# Patient Record
Sex: FEMALE | Race: BLACK OR AFRICAN AMERICAN | NOT HISPANIC OR LATINO | Employment: FULL TIME | URBAN - METROPOLITAN AREA
[De-identification: names, ages, dates, MRNs, and addresses within clinical notes are randomized per-mention and may not be internally consistent; named-entity substitution may affect disease eponyms.]

---

## 2019-05-22 ENCOUNTER — PROCEDURE VISIT (OUTPATIENT)
Dept: OBSTETRICS AND GYNECOLOGY | Age: 59
End: 2019-05-22

## 2019-05-22 ENCOUNTER — OFFICE VISIT (OUTPATIENT)
Dept: OBSTETRICS AND GYNECOLOGY | Age: 59
End: 2019-05-22

## 2019-05-22 VITALS
WEIGHT: 272 LBS | SYSTOLIC BLOOD PRESSURE: 136 MMHG | HEIGHT: 63 IN | BODY MASS INDEX: 48.2 KG/M2 | DIASTOLIC BLOOD PRESSURE: 84 MMHG

## 2019-05-22 DIAGNOSIS — Z12.72 VAGINAL PAP SMEAR: ICD-10-CM

## 2019-05-22 DIAGNOSIS — N89.8 VAGINAL DISCHARGE: ICD-10-CM

## 2019-05-22 DIAGNOSIS — L68.0 FAMILIAL HIRSUTISM: ICD-10-CM

## 2019-05-22 DIAGNOSIS — N39.46 MIXED STRESS AND URGE URINARY INCONTINENCE: ICD-10-CM

## 2019-05-22 DIAGNOSIS — Z01.419 WELL FEMALE EXAM WITH ROUTINE GYNECOLOGICAL EXAM: Primary | ICD-10-CM

## 2019-05-22 DIAGNOSIS — E66.01 OBESITY, CLASS III, BMI 40-49.9 (MORBID OBESITY) (HCC): ICD-10-CM

## 2019-05-22 DIAGNOSIS — I10 HYPERTENSION, ESSENTIAL: ICD-10-CM

## 2019-05-22 DIAGNOSIS — Z12.31 SCREENING MAMMOGRAM, ENCOUNTER FOR: ICD-10-CM

## 2019-05-22 DIAGNOSIS — Z13.89 SCREENING FOR BLOOD OR PROTEIN IN URINE: ICD-10-CM

## 2019-05-22 DIAGNOSIS — L68.0 FAMILIAL HIRSUTISM: Primary | ICD-10-CM

## 2019-05-22 LAB
BILIRUB BLD-MCNC: ABNORMAL MG/DL
CLARITY, POC: CLEAR
COLOR UR: YELLOW
GLUCOSE UR STRIP-MCNC: NEGATIVE MG/DL
KETONES UR QL: NEGATIVE
LEUKOCYTE EST, POC: NEGATIVE
NITRITE UR-MCNC: NEGATIVE MG/ML
PH UR: 6 [PH] (ref 5–8)
PROT UR STRIP-MCNC: ABNORMAL MG/DL
RBC # UR STRIP: NEGATIVE /UL
SP GR UR: 1.03 (ref 1–1.03)
UROBILINOGEN UR QL: NORMAL

## 2019-05-22 PROCEDURE — 99396 PREV VISIT EST AGE 40-64: CPT | Performed by: OBSTETRICS & GYNECOLOGY

## 2019-05-22 PROCEDURE — 81002 URINALYSIS NONAUTO W/O SCOPE: CPT | Performed by: OBSTETRICS & GYNECOLOGY

## 2019-05-22 PROCEDURE — 76830 TRANSVAGINAL US NON-OB: CPT | Performed by: OBSTETRICS & GYNECOLOGY

## 2019-05-22 PROCEDURE — 99213 OFFICE O/P EST LOW 20 MIN: CPT | Performed by: OBSTETRICS & GYNECOLOGY

## 2019-05-22 RX ORDER — LISINOPRIL AND HYDROCHLOROTHIAZIDE 20; 12.5 MG/1; MG/1
1 TABLET ORAL DAILY
COMMUNITY
End: 2020-05-26

## 2019-05-22 NOTE — PROGRESS NOTES
Routine Annual Visit    2019    Patient: Pita Dominguez          MR#:[id number]      History of Present Illness    Chief Complaint   Patient presents with   • Establish Care     New patient   • Gynecologic Exam     Annual.  c/o clear discharge and odor x several months.  No gyn care for more than 10 years. Had a cologard/stool test around 49 y.o. normal, through PCP.        58 y.o. female  who presents for annual exam.    Presents for routine annual exam with complaint of intermittent moderate clear discharge for several months.  She reports not having had an annual exam or mammogram for some time.  Her primary care office is contacted regarding her blood pressure medication for clarification of name and dosing.  That office reports that there is been some compliance issues with follow-up there.  The patient has an elderly parent in the nursing home he consumes a lot of her free time    Exam is remarkable for prominent hirsutism.  The patient states the problem dates back to her childhood and is never really worsen.    The patient also complains of intermittent moderate leakage of urine with cough and sneeze on a daily basis and is asking to see a specialist for treatment options.  She states the cough and leak for exacerbated by any type of Valsalva and the problems been for more than 10 years      No LMP recorded (lmp unknown). Patient has had a hysterectomy.  Obstetric History:  OB History      Para Term  AB Living    2       2      SAB TAB Ectopic Molar Multiple Live Births                        Menstrual History:     No LMP recorded (lmp unknown). Patient has had a hysterectomy.       Sexual History:       ________________________________________  Patient Active Problem List   Diagnosis   • Well female exam with routine gynecological exam   • Hypertension, essential   • Obesity, Class III, BMI 40-49.9 (morbid obesity) (CMS/[facility])   • Mixed stress and urge urinary incontinence   •  "Familial hirsutism       Past Medical History:   Diagnosis Date   • H/O birth defect     cerebal palsy   • History of uterine fibroid     Hysterectomy while in her 30's   • Hypertension        Past Surgical History:   Procedure Laterality Date   • HYSTERECTOMY      age 30's   • LEG SURGERY Left     Childhood due to cerebal palsy.    • WISDOM TOOTH EXTRACTION         Social History     Tobacco Use   Smoking Status Never Smoker   Smokeless Tobacco Never Used       Family History   Problem Relation Age of Onset   • Diabetes Mother    • Other Mother         clots in legs   • Cancer Maternal Uncle    • Cancer Maternal Uncle        Prior to Admission medications    Medication Sig Start Date End Date Taking? Authorizing Provider   lisinopril-hydrochlorothiazide (PRINZIDE,ZESTORETIC) 20-12.5 MG per tablet Take 1 tablet by mouth Daily.   Yes Provider, Juan R, MD     ________________________________________    Current contraception: status post hysterectomy  History of abnormal Pap smear: no  Family history of uterine or ovarian cancer: no  Family History of colon cancer/colon polyps: no  History of abnormal mammogram: no  History of abnormal lipids: no    The following portions of the patient's history were reviewed and updated as appropriate: allergies, current medications, past family history, past medical history, past social history, past surgical history and problem list.    Review of Systems    Pertinent items are noted in HPI.     Objective   Physical Exam    /84   Ht 160 cm (63\")   Wt 123 kg (272 lb)   LMP  (LMP Unknown)   Breastfeeding? No   BMI 48.18 kg/m²    BP Readings from Last 3 Encounters:   05/22/19 136/84      Wt Readings from Last 3 Encounters:   05/22/19 123 kg (272 lb)        BMI: Estimated body mass index is 48.18 kg/m² as calculated from the following:    Height as of this encounter: 160 cm (63\").    Weight as of this encounter: 123 kg (272 lb).       General: alert, appears stated age " and cooperative   Heart: regular rate and rhythm, S1, S2 normal, no murmur, click, rub or gallop   Lungs: clear to auscultation bilaterally   Abdomen: soft, non-tender, without masses, no organomegaly, rotund, exceptionally limited exam secondary to habitus   Breast: inspection negative, no nipple discharge or bleeding, no masses or nodularity palpable   Vulva: normal, bartholin's, Urethra, Driggs's normal and mild atrophy   Vagina: normal mucosa   Cervix: absent   Uterus: absent    Adnexa: exam limited by habitus     As part of wellness and prevention, the following topics were discussed with the patient:  []  Nutrition  [x]  Physical activity/regular exercise   []  Healthy weight  []  Injury prevention  []  Smoking cessation  []  Substance misuse/abuse  []  Sexual behavior  []  STD prevention  []  Contaception  []  Dental health  []  Mental health  []  Immunization  [x]  Encouraged SBE        Assessment:    Pita was seen today for establish care and gynecologic exam.    Diagnoses and all orders for this visit:    Well female exam with routine gynecological exam    Screening for blood or protein in urine  -     POC Urinalysis Dipstick    Vaginal discharge  - Nuswab    Screening mammogram, encounter for  -     Mammo Screening Bilateral With CAD; Future    Hypertension, essential    Obesity, Class III, BMI 40-49.9 (morbid obesity) (CMS/HCC)    Mixed stress and urge urinary incontinence (new)  -  Referral to urogynecology    Familial hirsutism  -Patient states long-standing problem dating back to her childhood.  Discussed ultrasound screening for exclusion of ovarian etiology.        Plan:  Return for Annual GYN exam.      Yifan Wood MD  5/22/2019 9:15 AM

## 2019-05-24 ENCOUNTER — TELEPHONE (OUTPATIENT)
Dept: OBSTETRICS AND GYNECOLOGY | Age: 59
End: 2019-05-24

## 2019-05-24 LAB
CYTOLOGIST CVX/VAG CYTO: NORMAL
CYTOLOGY CVX/VAG DOC CYTO: NORMAL
CYTOLOGY CVX/VAG DOC THIN PREP: NORMAL
DX ICD CODE: NORMAL
HIV 1 & 2 AB SER-IMP: NORMAL
HPV I/H RISK 4 DNA CVX QL PROBE+SIG AMP: NEGATIVE
OTHER STN SPEC: NORMAL
STAT OF ADQ CVX/VAG CYTO-IMP: NORMAL

## 2019-05-25 LAB
A VAGINAE DNA VAG QL NAA+PROBE: ABNORMAL SCORE
BVAB2 DNA VAG QL NAA+PROBE: ABNORMAL SCORE
C ALBICANS DNA VAG QL NAA+PROBE: NEGATIVE
C GLABRATA DNA VAG QL NAA+PROBE: POSITIVE
C TRACH RRNA SPEC QL NAA+PROBE: NEGATIVE
MEGA1 DNA VAG QL NAA+PROBE: ABNORMAL SCORE
N GONORRHOEA RRNA SPEC QL NAA+PROBE: NEGATIVE
T VAGINALIS RRNA SPEC QL NAA+PROBE: NEGATIVE

## 2019-05-27 NOTE — PROGRESS NOTES
Notify patient: Vaginosis panel returns positive for vaginal yeast.    Prescription for yeast medicine sent the patient's pharmacy

## 2019-05-28 ENCOUNTER — TELEPHONE (OUTPATIENT)
Dept: OBSTETRICS AND GYNECOLOGY | Age: 59
End: 2019-05-28

## 2019-05-28 NOTE — TELEPHONE ENCOUNTER
----- Message from Yifan Wood MD sent at 5/27/2019  5:39 PM EDT -----  Notify patient: Vaginosis panel returns positive for vaginal yeast.    Prescription for yeast medicine sent the patient's pharmacy

## 2020-05-04 ENCOUNTER — TELEPHONE (OUTPATIENT)
Dept: OBSTETRICS AND GYNECOLOGY | Age: 60
End: 2020-05-04

## 2020-05-04 DIAGNOSIS — Z12.31 SCREENING MAMMOGRAM, ENCOUNTER FOR: Primary | ICD-10-CM

## 2020-05-04 NOTE — TELEPHONE ENCOUNTER
([place] pt) Pt is requesting a routine MG order to be placed for [facility] in [place].     [phone]

## 2020-05-26 ENCOUNTER — OFFICE VISIT (OUTPATIENT)
Dept: OBSTETRICS AND GYNECOLOGY | Age: 60
End: 2020-05-26

## 2020-05-26 VITALS
SYSTOLIC BLOOD PRESSURE: 124 MMHG | WEIGHT: 248 LBS | HEIGHT: 63 IN | BODY MASS INDEX: 43.94 KG/M2 | DIASTOLIC BLOOD PRESSURE: 80 MMHG

## 2020-05-26 DIAGNOSIS — L68.0 FAMILIAL HIRSUTISM: ICD-10-CM

## 2020-05-26 DIAGNOSIS — N39.46 MIXED STRESS AND URGE URINARY INCONTINENCE: ICD-10-CM

## 2020-05-26 DIAGNOSIS — Z01.419 WELL FEMALE EXAM WITH ROUTINE GYNECOLOGICAL EXAM: Primary | ICD-10-CM

## 2020-05-26 DIAGNOSIS — E66.01 OBESITY, CLASS III, BMI 40-49.9 (MORBID OBESITY) (HCC): ICD-10-CM

## 2020-05-26 DIAGNOSIS — Z12.4 SCREENING FOR MALIGNANT NEOPLASM OF THE CERVIX: ICD-10-CM

## 2020-05-26 DIAGNOSIS — Z13.89 SCREENING FOR BLOOD OR PROTEIN IN URINE: ICD-10-CM

## 2020-05-26 DIAGNOSIS — I10 HYPERTENSION, ESSENTIAL: ICD-10-CM

## 2020-05-26 LAB
BILIRUB BLD-MCNC: ABNORMAL MG/DL
GLUCOSE UR STRIP-MCNC: NEGATIVE MG/DL
KETONES UR QL: NEGATIVE
LEUKOCYTE EST, POC: NEGATIVE
NITRITE UR-MCNC: NEGATIVE MG/ML
PH UR: 7 [PH] (ref 5–8)
PROT UR STRIP-MCNC: ABNORMAL MG/DL
RBC # UR STRIP: NEGATIVE /UL
SP GR UR: 1.02 (ref 1–1.03)
UROBILINOGEN UR QL: NORMAL

## 2020-05-26 PROCEDURE — 81002 URINALYSIS NONAUTO W/O SCOPE: CPT | Performed by: OBSTETRICS & GYNECOLOGY

## 2020-05-26 PROCEDURE — G0101 CA SCREEN;PELVIC/BREAST EXAM: HCPCS | Performed by: OBSTETRICS & GYNECOLOGY

## 2020-05-26 RX ORDER — OXYBUTYNIN CHLORIDE 5 MG/1
5 TABLET ORAL 3 TIMES DAILY
COMMUNITY

## 2020-05-26 NOTE — PROGRESS NOTES
Routine Annual Visit    2020    Patient: Pita Dominguez          MR#:[id number]      History of Present Illness    Chief Complaint   Patient presents with   • Gynecologic Exam     annual , last pap 19 , mmg 06/10/19 @ [facility]  , colonoscopy pt had a cologard/stool test around 49 y.o. normal  through PCP,  pt is taking a bladder pill prescribed by dr daren baer the name of the pill ., pill helps the urgency  will call pharmacy        59 y.o. female  who presents for annual exam.    The patient presents for routine annual exam with complaint of intermittent daily urinary incontinence that is longstanding with improvement in urge incontinence.  Ditropan has been fairly successful in controlling her urge incontinence.    No LMP recorded (lmp unknown). Patient has had a hysterectomy.  Obstetric History:  OB History        2    Para        Term                AB   2    Living           SAB        TAB        Ectopic        Molar        Multiple        Live Births                   Menstrual History:     No LMP recorded (lmp unknown). Patient has had a hysterectomy.       Sexual History:       ________________________________________  Patient Active Problem List   Diagnosis   • Well female exam with routine gynecological exam   • Hypertension, essential   • Obesity, Class III, BMI 40-49.9 (morbid obesity) (CMS/HCC)   • Mixed stress and urge urinary incontinence   • Familial hirsutism       Past Medical History:   Diagnosis Date   • H/O birth defect     cerebal palsy   • History of uterine fibroid     Hysterectomy while in her 30's   • Hypertension        Past Surgical History:   Procedure Laterality Date   • HYSTERECTOMY      age 30's   • LEG SURGERY Left     Childhood due to cerebal palsy.    • WISDOM TOOTH EXTRACTION         Social History     Tobacco Use   Smoking Status Never Smoker   Smokeless Tobacco Never Used       Family History   Problem Relation Age of Onset   •  "Diabetes Mother    • Other Mother         clots in legs   • Cancer Maternal Uncle    • Cancer Maternal Uncle        Prior to Admission medications    Medication Sig Start Date End Date Taking? Authorizing Provider   oxybutynin (DITROPAN) 5 MG tablet Take 5 mg by mouth 3 (Three) Times a Day.   Yes Juan R Johnson MD   terconazole (TERAZOL 3) 0.8 % vaginal cream Insert 1 applicator into the vagina Every Night. 5/27/19 5/26/20 Yes Yifan Wood MD   lisinopril-hydrochlorothiazide (PRINZIDE,ZESTORETIC) 20-12.5 MG per tablet Take 1 tablet by mouth Daily.  5/26/20  ProviderJuan R MD     ________________________________________    Current contraception: status post hysterectomy  History of abnormal Pap smear: no  Family history of uterine or ovarian cancer: no  Family History of colon cancer/colon polyps: no  History of abnormal mammogram: no  History of abnormal lipids: no    The following portions of the patient's history were reviewed and updated as appropriate: allergies, current medications, past family history, past medical history, past social history, past surgical history and problem list.    Review of Systems    Pertinent items are noted in HPI.     Objective   Physical Exam    /80   Ht 160 cm (63\")   Wt 112 kg (248 lb)   LMP  (LMP Unknown)   Breastfeeding No   BMI 43.93 kg/m²    BP Readings from Last 3 Encounters:   05/26/20 124/80   05/22/19 136/84      Wt Readings from Last 3 Encounters:   05/26/20 112 kg (248 lb)   05/22/19 123 kg (272 lb)        BMI: Estimated body mass index is 43.93 kg/m² as calculated from the following:    Height as of this encounter: 160 cm (63\").    Weight as of this encounter: 112 kg (248 lb).       General: alert, appears stated age, cooperative and moderately obese   Heart: regular rate and rhythm, S1, S2 normal, no murmur, click, rub or gallop   Lungs: clear to auscultation bilaterally   Abdomen: soft, non-tender, without masses, no organomegaly   Breast: " inspection negative, no nipple discharge or bleeding, no masses or nodularity palpable   Vulva: normal, bartholin's, Urethra, Headrick's normal and mild atrophy   Vagina: normal mucosa, normal discharge, Odor from chronic incontience    Cervix: absent   Uterus: absent    Adnexa: exam limited by habitus     As part of wellness and prevention, the following topics were discussed with the patient:  Encouraged self breast exam    Assessment:    Pita was seen today for gynecologic exam.    Diagnoses and all orders for this visit:    Screening for blood or protein in urine  -     POC Urinalysis Dipstick    Well female exam with routine gynecological exam    Obesity, Class III, BMI 40-49.9 (morbid obesity) (CMS/HCC)    Mixed stress and urge urinary incontinence    Hypertension, essential    Familial hirsutism          Plan:  No follow-ups on file.      Yifan Wood MD  5/26/2020 12:06

## 2020-05-29 LAB
CYTOLOGIST CVX/VAG CYTO: NORMAL
CYTOLOGY CVX/VAG DOC CYTO: NORMAL
CYTOLOGY CVX/VAG DOC THIN PREP: NORMAL
DX ICD CODE: NORMAL
HIV 1 & 2 AB SER-IMP: NORMAL
HPV I/H RISK 4 DNA CVX QL PROBE+SIG AMP: NEGATIVE
Lab: NORMAL
OTHER STN SPEC: NORMAL
STAT OF ADQ CVX/VAG CYTO-IMP: NORMAL

## 2020-06-01 ENCOUNTER — TELEPHONE (OUTPATIENT)
Dept: OBSTETRICS AND GYNECOLOGY | Age: 60
End: 2020-06-01

## 2022-06-08 ENCOUNTER — OFFICE VISIT (OUTPATIENT)
Dept: OBSTETRICS AND GYNECOLOGY | Age: 62
End: 2022-06-08

## 2022-06-08 VITALS
DIASTOLIC BLOOD PRESSURE: 68 MMHG | BODY MASS INDEX: 37.39 KG/M2 | WEIGHT: 211 LBS | SYSTOLIC BLOOD PRESSURE: 122 MMHG | HEIGHT: 63 IN

## 2022-06-08 DIAGNOSIS — Z12.31 SCREENING MAMMOGRAM, ENCOUNTER FOR: ICD-10-CM

## 2022-06-08 DIAGNOSIS — E66.9 OBESITY (BMI 30-39.9): ICD-10-CM

## 2022-06-08 DIAGNOSIS — N39.46 MIXED STRESS AND URGE URINARY INCONTINENCE: ICD-10-CM

## 2022-06-08 DIAGNOSIS — N90.89 VULVAR LESION: ICD-10-CM

## 2022-06-08 DIAGNOSIS — Z01.419 WELL FEMALE EXAM WITH ROUTINE GYNECOLOGICAL EXAM: Primary | ICD-10-CM

## 2022-06-08 DIAGNOSIS — Z13.89 SCREENING FOR BLOOD OR PROTEIN IN URINE: ICD-10-CM

## 2022-06-08 DIAGNOSIS — E11.65 TYPE 2 DIABETES MELLITUS WITH HYPERGLYCEMIA, WITHOUT LONG-TERM CURRENT USE OF INSULIN: ICD-10-CM

## 2022-06-08 PROBLEM — G80.9 CEREBRAL PALSY: Status: ACTIVE | Noted: 2022-06-08

## 2022-06-08 LAB
BILIRUB BLD-MCNC: ABNORMAL MG/DL
GLUCOSE UR STRIP-MCNC: NEGATIVE MG/DL
KETONES UR QL: ABNORMAL
LEUKOCYTE EST, POC: NEGATIVE
NITRITE UR-MCNC: NEGATIVE MG/ML
PH UR: 5.5 [PH] (ref 5–8)
PROT UR STRIP-MCNC: ABNORMAL MG/DL
RBC # UR STRIP: NEGATIVE /UL
SP GR UR: 1.02 (ref 1–1.03)
UROBILINOGEN UR QL: ABNORMAL

## 2022-06-08 PROCEDURE — 99396 PREV VISIT EST AGE 40-64: CPT | Performed by: OBSTETRICS & GYNECOLOGY

## 2022-06-08 PROCEDURE — 99213 OFFICE O/P EST LOW 20 MIN: CPT | Performed by: OBSTETRICS & GYNECOLOGY

## 2022-06-08 PROCEDURE — 81002 URINALYSIS NONAUTO W/O SCOPE: CPT | Performed by: OBSTETRICS & GYNECOLOGY

## 2022-06-08 RX ORDER — BLOOD SUGAR DIAGNOSTIC
STRIP MISCELLANEOUS
COMMUNITY
Start: 2022-06-06

## 2022-06-08 RX ORDER — LANCETS
EACH MISCELLANEOUS
COMMUNITY
Start: 2022-06-06

## 2022-06-08 RX ORDER — METFORMIN HYDROCHLORIDE 500 MG/1
TABLET, EXTENDED RELEASE ORAL
COMMUNITY
Start: 2022-05-16

## 2022-06-08 RX ORDER — BLOOD-GLUCOSE METER
EACH MISCELLANEOUS
COMMUNITY
Start: 2022-03-25

## 2022-06-08 NOTE — PROGRESS NOTES
[facility]   Obstetrics and Gynecology       2022    Patient: Pita Dominguez          MR#:[id number]    History of Present Illness    Chief Complaint   Patient presents with   • Gynecologic Exam     last pap 2020 neg, last mg 2019       61 y.o. female  who presents for annual exam.    Patient presents for her regular annual exam feeling well without major complaints stating that she has been diagnosed with diabetes since her last visit.  She is working hard to lose weight and eating better is lost approximately 29 pounds.    Patient has chronic urinary incontinence for which therapy is impacted minimally.    Patient's exam is remarkable for a hyperkeratotic lesion involving the inferior left vulva.  The patient denies symptoms related to the lesion.    Studies reviewed:    No LMP recorded (lmp unknown). Patient has had a hysterectomy.  Obstetric History:  OB History        2    Para        Term                AB   2    Living           SAB        IAB        Ectopic        Molar        Multiple        Live Births                   Menstrual History:     No LMP recorded (lmp unknown). Patient has had a hysterectomy.       Sexual History:       Social History     Substance and Sexual Activity   Sexual Activity Not Currently   • Partners: Male   • Birth control/protection: Surgical    Comment: Hysterectomy 30's       ________________________________________  Patient Active Problem List   Diagnosis   • Well female exam with routine gynecological exam   • Hypertension, essential   • Obesity (BMI 30-39.9)   • Mixed stress and urge urinary incontinence   • Familial hirsutism   • Cerebral palsy (HCC)   • Type 2 diabetes mellitus with hyperglycemia, without long-term current use of insulin (HCC)   • Vulvar lesion     Past Medical History:   Diagnosis Date   • H/O birth defect     cerebal palsy   • History of uterine fibroid     Hysterectomy while in her 30's   • Hypertension   "    Past Surgical History:   Procedure Laterality Date   • HYSTERECTOMY      age 30's   • LEG SURGERY Left     Childhood due to cerebal palsy.    • WISDOM TOOTH EXTRACTION       Social History     Tobacco Use   Smoking Status Never Smoker   Smokeless Tobacco Never Used     Family History   Problem Relation Age of Onset   • Diabetes Mother    • Other Mother         clots in legs   • Cancer Maternal Uncle    • Cancer Maternal Uncle      Prior to Admission medications    Medication Sig Start Date End Date Taking? Authorizing Provider   Accu-Chek Guide test strip USE TO TEST BLOOD SUGAR TWICE DAILY TO THREE TIMES DAILY 6/6/22  Yes Juan R Johnson MD   Accu-Chek Softclix Lancets lancets USE TO TEST BLOOD SUGAR DAILY AS NEEDED 6/6/22  Yes Juan R Johnson MD   Blood Glucose Monitoring Suppl (Accu-Chek Guide Me) w/Device kit USE TO TEST BLOOD SUGAR ONCE DAILY AS NEEDED 3/25/22  Yes Juan R Johnson MD   metFORMIN ER (GLUCOPHAGE-XR) 500 MG 24 hr tablet TAKE ONE TABLET BY MOUTH EVERY DAY WITH THE EVENING MEAL 5/16/22  Yes Juan R Johnson MD   oxybutynin (DITROPAN) 5 MG tablet Take 5 mg by mouth 3 (Three) Times a Day.    Juan R Johnson MD     ________________________________________    Current contraception: status post hysterectomy  History of abnormal Pap smear: no  Family history of uterine or ovarian cancer: no  Family History of colon cancer/colon polyps: no  History of abnormal mammogram: no  History of abnormal lipids: no    The following portions of the patient's history were reviewed and updated as appropriate: allergies, current medications, past family history, past medical history, past social history, past surgical history and problem list.    Review of Systems    Pertinent items are noted in HPI.       Objective   Physical Exam  Genitourinary:            /68   Ht 160 cm (63\")   Wt 95.7 kg (211 lb)   LMP  (LMP Unknown)   Breastfeeding No   BMI 37.38 kg/m²    BP Readings " "from Last 3 Encounters:   06/08/22 122/68   05/26/20 124/80   05/22/19 136/84      Wt Readings from Last 3 Encounters:   06/08/22 95.7 kg (211 lb)   05/26/20 112 kg (248 lb)   05/22/19 123 kg (272 lb)        BMI: Estimated body mass index is 37.38 kg/m² as calculated from the following:    Height as of this encounter: 160 cm (63\").    Weight as of this encounter: 95.7 kg (211 lb).       General: alert, appears stated age and cooperative   Heart: regular rate and rhythm, S1, S2 normal, no murmur, click, rub or gallop   Lungs: clear to auscultation bilaterally   Abdomen: soft, non-tender, without masses, no organomegaly   Breast: inspection negative, no nipple discharge or bleeding, no masses or nodularity palpable   External genitalia/Vulva: See notes, hyperkeratotic irregularly pigmented 3 similar lesion on the lateral vulva, External genitalia including bartholin's glands, Urethra, Veteran's gland and urethra meatus are normal and Bladder appears normal without significant prolapse    Vagina: normal mucosa, normal discharge   Cervix: absent   Uterus: absent    Adnexa: exam limited by habitus     As part of wellness and prevention, the following topics were discussed with the patient:  Encouraged self breast exam  Physical activity and regular exercised encouraged.   Nutrition discussed.      Assessment:  Diagnoses and all orders for this visit:    1. Well female exam with routine gynecological exam (Primary)    2. Screening for blood or protein in urine  -     POC Urinalysis Dipstick    3. Obesity (BMI 30-39.9)    4. Mixed stress and urge urinary incontinence    5. Type 2 diabetes mellitus with hyperglycemia, without long-term current use of insulin (HCC)    6. Vulvar lesion    7. Screening mammogram, encounter for  -     Mammo Screening Digital Tomosynthesis Bilateral With CAD; Future        Plan:  Return in about 4 weeks (around 7/6/2022) for vulvar biopsy.    Yifan Wood MD  6/8/2022 12:03 EDT  "

## 2022-07-06 ENCOUNTER — OFFICE VISIT (OUTPATIENT)
Dept: OBSTETRICS AND GYNECOLOGY | Age: 62
End: 2022-07-06

## 2022-07-06 VITALS
DIASTOLIC BLOOD PRESSURE: 70 MMHG | WEIGHT: 212 LBS | SYSTOLIC BLOOD PRESSURE: 124 MMHG | HEIGHT: 63 IN | BODY MASS INDEX: 37.56 KG/M2

## 2022-07-06 DIAGNOSIS — E66.9 OBESITY (BMI 30-39.9): ICD-10-CM

## 2022-07-06 DIAGNOSIS — I10 HYPERTENSION, ESSENTIAL: ICD-10-CM

## 2022-07-06 DIAGNOSIS — Z13.89 SCREENING FOR BLOOD OR PROTEIN IN URINE: ICD-10-CM

## 2022-07-06 DIAGNOSIS — Z01.812 PRE-PROCEDURE LAB EXAM: ICD-10-CM

## 2022-07-06 DIAGNOSIS — N39.46 MIXED STRESS AND URGE URINARY INCONTINENCE: ICD-10-CM

## 2022-07-06 DIAGNOSIS — E11.65 TYPE 2 DIABETES MELLITUS WITH HYPERGLYCEMIA, WITHOUT LONG-TERM CURRENT USE OF INSULIN: ICD-10-CM

## 2022-07-06 DIAGNOSIS — N90.89 VULVAR LESION: Primary | ICD-10-CM

## 2022-07-06 LAB
B-HCG UR QL: NEGATIVE
BILIRUB BLD-MCNC: ABNORMAL MG/DL
CLARITY, POC: CLEAR
COLOR UR: YELLOW
EXPIRATION DATE: NORMAL
GLUCOSE UR STRIP-MCNC: NEGATIVE MG/DL
INTERNAL NEGATIVE CONTROL: NEGATIVE
INTERNAL POSITIVE CONTROL: POSITIVE
KETONES UR QL: ABNORMAL
LEUKOCYTE EST, POC: NEGATIVE
Lab: NORMAL
NITRITE UR-MCNC: NEGATIVE MG/ML
PH UR: 5.5 [PH] (ref 5–8)
PROT UR STRIP-MCNC: ABNORMAL MG/DL
RBC # UR STRIP: NEGATIVE /UL
SP GR UR: 1.02 (ref 1–1.03)
UROBILINOGEN UR QL: NORMAL

## 2022-07-06 PROCEDURE — 81025 URINE PREGNANCY TEST: CPT | Performed by: OBSTETRICS & GYNECOLOGY

## 2022-07-06 PROCEDURE — 81002 URINALYSIS NONAUTO W/O SCOPE: CPT | Performed by: OBSTETRICS & GYNECOLOGY

## 2022-07-06 PROCEDURE — 99213 OFFICE O/P EST LOW 20 MIN: CPT | Performed by: OBSTETRICS & GYNECOLOGY

## 2022-07-06 PROCEDURE — 56605 BIOPSY OF VULVA/PERINEUM: CPT | Performed by: OBSTETRICS & GYNECOLOGY

## 2022-07-06 NOTE — PROGRESS NOTES
[facility]   Obstetrics and Gynecology     2022      Patient:  Pita Dominguez   MR#:[id number]    Office note    Chief Complaint   Patient presents with   • Follow-up     vulvar biopsy        Subjective     History of Present Illness  61 y.o. female  presents for follow-up on previous annual exam that showed a atypical vulvar lesion involving the lower right lateral vulvar region with irregular pigmentation and hyperkeratotic appearance.  The patient's past medical history is complicated by poorly controlled type 2 diabetes, cerebral palsy and chronic urinary incontinence contributing to local tissue irritation.    Relevant data reviewed:      Patient Active Problem List   Diagnosis   • Well female exam with routine gynecological exam   • Hypertension, essential   • Obesity (BMI 30-39.9)   • Mixed stress and urge urinary incontinence   • Familial hirsutism   • Cerebral palsy (HCC)   • Type 2 diabetes mellitus with hyperglycemia, without long-term current use of insulin (HCC)   • Vulvar lesion       Past Medical History:   Diagnosis Date   • H/O birth defect     cerebal palsy   • History of uterine fibroid     Hysterectomy while in her 30's   • Hypertension      Past Surgical History:   Procedure Laterality Date   • HYSTERECTOMY      age 30's   • LEG SURGERY Left     Childhood due to cerebal palsy.    • WISDOM TOOTH EXTRACTION       Obstetric History:  OB History        2    Para        Term                AB   2    Living           SAB        IAB        Ectopic        Molar        Multiple        Live Births                   Menstrual History:     No LMP recorded (lmp unknown). Patient has had a hysterectomy.       # 1 - Date: None, Sex: None, Weight: None, GA: None, Delivery: None, Apgar1: None, Apgar5: None, Living: None, Birth Comments: None    # 2 - Date: None, Sex: None, Weight: None, GA: None, Delivery: None, Apgar1: None, Apgar5: None, Living: None, Birth Comments:  "None    Family History   Problem Relation Age of Onset   • Diabetes Mother    • Other Mother         clots in legs   • Cancer Maternal Uncle    • Cancer Maternal Uncle      Social History     Tobacco Use   • Smoking status: Never Smoker   • Smokeless tobacco: Never Used   Substance Use Topics   • Alcohol use: No   • Drug use: No     Codeine    Current Outpatient Medications:   •  Accu-Chek Guide test strip, USE TO TEST BLOOD SUGAR TWICE DAILY TO THREE TIMES DAILY, Disp: , Rfl:   •  Accu-Chek Softclix Lancets lancets, USE TO TEST BLOOD SUGAR DAILY AS NEEDED, Disp: , Rfl:   •  Blood Glucose Monitoring Suppl (Accu-Chek Guide Me) w/Device kit, USE TO TEST BLOOD SUGAR ONCE DAILY AS NEEDED, Disp: , Rfl:   •  metFORMIN ER (GLUCOPHAGE-XR) 500 MG 24 hr tablet, TAKE ONE TABLET BY MOUTH EVERY DAY WITH THE EVENING MEAL, Disp: , Rfl:   •  oxybutynin (DITROPAN) 5 MG tablet, Take 5 mg by mouth 3 (Three) Times a Day., Disp: , Rfl:     The following portions of the patient's history were reviewed and updated as appropriate: allergies, current medications, past family history, past medical history, past social history, past surgical history and problem list.    Review of Systems   Constitutional: Negative.    Respiratory: Negative.    Cardiovascular: Negative.    Gastrointestinal: Negative.    Genitourinary: Negative.    Psychiatric/Behavioral: Negative.        BP Readings from Last 3 Encounters:   07/06/22 124/70   06/08/22 122/68   05/26/20 124/80      Wt Readings from Last 3 Encounters:   07/06/22 96.2 kg (212 lb)   06/08/22 95.7 kg (211 lb)   05/26/20 112 kg (248 lb)      BMI: Estimated body mass index is 37.55 kg/m² as calculated from the following:    Height as of this encounter: 160 cm (63\").    Weight as of this encounter: 96.2 kg (212 lb). BSA: Estimated body surface area is 1.98 meters squared as calculated from the following:    Height as of this encounter: 160 cm (63\").    Weight as of this encounter: 96.2 kg (212 " lb).    Objective   Physical Exam  Genitourinary:            Procedure Note     The area denoted is cleaned well with betadine and injected with 4 cc of 1% lidocaine with epinephrine.    6 mm punch biopsy is obtained from the central region of the lesion closure was made with interrupted 4-0 Vicryl    The procedure is well tolerated by the patient           Assessment & Plan     Diagnoses and all orders for this visit:    1. Vulvar lesion (Primary)    2. Screening for blood or protein in urine  -     POC Urinalysis Dipstick    3. Pre-procedure lab exam  -     POC Pregnancy, Urine    4. Hypertension, essential    5. Mixed stress and urge urinary incontinence    6. Obesity (BMI 30-39.9)    7. Type 2 diabetes mellitus with hyperglycemia, without long-term current use of insulin (HCC)          No follow-ups on file.    Yifan Wood MD   7/6/2022 12:35 EDT

## 2022-07-08 DIAGNOSIS — D07.1 VULVAR INTRAEPITHELIAL NEOPLASIA (VIN) GRADE 3: Primary | ICD-10-CM

## 2022-07-08 LAB
DX ICD CODE: NORMAL
DX ICD CODE: NORMAL
PATH REPORT.FINAL DX SPEC: NORMAL
PATH REPORT.GROSS SPEC: NORMAL
PATH REPORT.SITE OF ORIGIN SPEC: NORMAL
PATHOLOGIST NAME: NORMAL
PAYMENT PROCEDURE: NORMAL